# Patient Record
Sex: MALE | Race: WHITE | NOT HISPANIC OR LATINO | Employment: OTHER | URBAN - METROPOLITAN AREA
[De-identification: names, ages, dates, MRNs, and addresses within clinical notes are randomized per-mention and may not be internally consistent; named-entity substitution may affect disease eponyms.]

---

## 2019-08-12 ENCOUNTER — OFFICE VISIT (OUTPATIENT)
Dept: GASTROENTEROLOGY | Facility: CLINIC | Age: 19
End: 2019-08-12

## 2019-08-12 VITALS
SYSTOLIC BLOOD PRESSURE: 112 MMHG | WEIGHT: 165.6 LBS | HEIGHT: 69 IN | DIASTOLIC BLOOD PRESSURE: 66 MMHG | TEMPERATURE: 98.6 F | BODY MASS INDEX: 24.53 KG/M2

## 2019-08-12 DIAGNOSIS — L29.0 ANAL PRURITUS: Primary | ICD-10-CM

## 2019-08-12 PROCEDURE — 99203 OFFICE O/P NEW LOW 30 MIN: CPT | Performed by: INTERNAL MEDICINE

## 2019-08-12 RX ORDER — UREA 10 %
3 LOTION (ML) TOPICAL
COMMUNITY

## 2019-08-12 NOTE — PROGRESS NOTES
Chief Complaint   Patient presents with   • Anal Itching     Subjective   HPI  Rex Bill is a 18 y.o. male who presents for evaluation of anal pruritis.    Symptoms off/on for last 3-4 years.  Typically can occur for 3-4 mos at a time.  Dad notes they seem to be associated with periods of stress/anxiety.  Patient thought at one point last year he may have had pinworm, but testing was negative.  He was given a rx for albendazole with some brief improvement in sx.  He was recently retested by PCP with O&P and pin worm prep which were negative.  He has been using proctosol cream QHS with only minimal relief in sx.  He has not tried OTC antihistamine.  He occasionally uses flushable wipes.      Past Medical History:   Diagnosis Date   • Anxiety        Current Outpatient Medications:   •  buPROPion HCl (WELLBUTRIN PO), Take  by mouth. PRN, Disp: , Rfl:   •  melatonin 1 MG tablet, Take 3 mg by mouth., Disp: , Rfl:   •  PROCTOSOL HC 2.5 % rectal cream, , Disp: , Rfl:   No Known Allergies  Social History     Socioeconomic History   • Marital status: Single     Spouse name: Not on file   • Number of children: Not on file   • Years of education: Not on file   • Highest education level: Not on file   Tobacco Use   • Smoking status: Never Smoker   • Smokeless tobacco: Never Used   Substance and Sexual Activity   • Alcohol use: Yes   • Drug use: No     History reviewed. No pertinent family history.  Review of Systems   Constitutional: Negative.    Gastrointestinal:        Anal itching       Objective   Vitals:    08/12/19 1345   BP: 112/66   Temp: 98.6 °F (37 °C)     Physical Exam   Constitutional: He is oriented to person, place, and time. He appears well-developed and well-nourished.   HENT:   Head: Normocephalic and atraumatic.   Abdominal: Soft. Bowel sounds are normal. He exhibits no distension and no mass. There is no tenderness. No hernia.   Genitourinary: Rectal exam shows no external hemorrhoid, no internal hemorrhoid  and no fissure.   Neurological: He is alert and oriented to person, place, and time.   Skin: Skin is warm and dry.   Psychiatric: He has a normal mood and affect. His behavior is normal. Judgment and thought content normal.   Vitals reviewed.    Assessment/Plan   Assessment:     1. Anal pruritus      Plan:   Recommend zinc containing topical barrier cream bid  Recommend QHS dose of benadryl in addition to topical steroid cream  Avoid cleansing wipes, advised to keep perianal area clean/dry  Check CMP and TSH today  If symptoms persist, consider referral to colorectal surgery for further evaluation          Dilip Bond M.D.  [facility]  [address] [place] [address]  Office: [phone]

## 2019-08-13 LAB
ALBUMIN SERPL-MCNC: 5 G/DL (ref 3.5–5.2)
ALBUMIN/GLOB SERPL: 2.6 G/DL
ALP SERPL-CCNC: 86 U/L (ref 39–117)
ALT SERPL-CCNC: 15 U/L (ref 1–41)
AST SERPL-CCNC: 19 U/L (ref 1–40)
BILIRUB SERPL-MCNC: 0.9 MG/DL (ref 0.2–1.2)
BUN SERPL-MCNC: 19 MG/DL (ref 6–20)
BUN/CREAT SERPL: 18.6 (ref 7–25)
CALCIUM SERPL-MCNC: 9.6 MG/DL (ref 8.6–10.5)
CHLORIDE SERPL-SCNC: 101 MMOL/L (ref 98–107)
CO2 SERPL-SCNC: 26.4 MMOL/L (ref 22–29)
CREAT SERPL-MCNC: 1.02 MG/DL (ref 0.76–1.27)
GLOBULIN SER CALC-MCNC: 1.9 GM/DL
GLUCOSE SERPL-MCNC: 90 MG/DL (ref 65–99)
POTASSIUM SERPL-SCNC: 4.8 MMOL/L (ref 3.5–5.2)
PROT SERPL-MCNC: 6.9 G/DL (ref 6–8.5)
SODIUM SERPL-SCNC: 140 MMOL/L (ref 136–145)
TSH SERPL DL<=0.005 MIU/L-ACNC: 1.3 MIU/ML (ref 0.27–4.2)

## 2019-08-22 ENCOUNTER — TELEPHONE (OUTPATIENT)
Dept: GASTROENTEROLOGY | Facility: CLINIC | Age: 19
End: 2019-08-22

## 2019-08-22 NOTE — TELEPHONE ENCOUNTER
----- Message from Dilip Bond MD sent at 8/22/2019  4:20 PM EDT -----  Normal  If sx of anal itching still persisting, recommend referral to Dr Rayo Woodruff

## 2021-04-05 ENCOUNTER — IMMUNIZATION (OUTPATIENT)
Dept: VACCINE CLINIC | Facility: HOSPITAL | Age: 21
End: 2021-04-05

## 2021-04-05 PROCEDURE — 0001A: CPT | Performed by: INTERNAL MEDICINE

## 2021-04-05 PROCEDURE — 91300 HC SARSCOV02 VAC 30MCG/0.3ML IM: CPT | Performed by: INTERNAL MEDICINE

## 2021-04-26 ENCOUNTER — IMMUNIZATION (OUTPATIENT)
Dept: VACCINE CLINIC | Facility: HOSPITAL | Age: 21
End: 2021-04-26

## 2021-04-26 PROCEDURE — 91300 HC SARSCOV02 VAC 30MCG/0.3ML IM: CPT | Performed by: INTERNAL MEDICINE

## 2021-04-26 PROCEDURE — 0002A: CPT | Performed by: INTERNAL MEDICINE
